# Patient Record
Sex: FEMALE | Race: WHITE | URBAN - METROPOLITAN AREA
[De-identification: names, ages, dates, MRNs, and addresses within clinical notes are randomized per-mention and may not be internally consistent; named-entity substitution may affect disease eponyms.]

---

## 2020-10-03 ENCOUNTER — NURSE TRIAGE (OUTPATIENT)
Dept: NURSING | Facility: CLINIC | Age: 60
End: 2020-10-03

## 2020-10-03 NOTE — TELEPHONE ENCOUNTER
Patient has a colonoscopy ordered on Thursday. She has not gotten any direction on stopping her blood thinner. Advised that she will need direction about this from her PCP. She should call them on Monday morning to discuss.  Gem Munoz RN  [facility]